# Patient Record
(demographics unavailable — no encounter records)

---

## 2024-10-08 NOTE — END OF VISIT
[] : Resident [FreeTextEntry3] : Pt. was seen 10/2/24 for annual physical exam, pt. felt dizzy and fell, landed on the floor and unable to get up.  Pt. found to have hypoxia required 3L O2, was transfer to ER for evaluation.  Pt. found to have hypercapnic hypoxia, refused O2 and uncooperative in ER.  Pt. was discharged, and here for follow up.  Pt. denies any injury as a result of fall.  During pt's last visit, pt. was c/o sore on lateral aspect of tongue x1 month, will refer pt. to oral surgery for evaluation. Also c/o leg swelling, will order 2D echo and UA.  Had Tdap 4/20/16, flu vaccine 10/2/24.  Blood work, advised staff to complete testing.  Had abdominal sono 8/29/24 showed hepatomegaly and increase liver echogenicity c/w steatosis or hepatocellular disease. Seen podiatry 3/27/24, but no showed for his ophthal. appointment 9/24/24, and no showed for cardiology 9/25/24; advised staff to reschedule ophthal., cardiology, and podiatry appointment.  Will refer pt. to pulmonary for evaluation of hypercapnic hypoxia.  Follow nutrition 11/14/24.  RTC 3 months.

## 2024-10-08 NOTE — REVIEW OF SYSTEMS
[Lower Ext Edema] : lower extremity edema [Cough] : cough [Negative] : Psychiatric [FreeTextEntry4] : + R tongue pain [FreeTextEntry5] : +airway congestion + LE edema [FreeTextEntry6] : +productive cough [FreeTextEntry9] : +leg swelling and pain

## 2024-10-08 NOTE — ASSESSMENT
[FreeTextEntry1] : 35-year-old M patient with PMH of intellectual delay from group home (high functioning), hypothyroidism, obesity and Type II DM, active smoker presented to the clinic for comprehensive office visit.   # Desaturation  # Hypercapnea  # OLSON - likely 2/2 to [facility] - Will refer to pulmonary for further workup  - Advised weight loss management, with diet and exercise   #Tongue sore - persisting sx for the past 1 month - s/p tooth removal - refered to oral surgeron  #Bilateral LE swelling and pain #Bilateral foot pain - Exam showing bilateral LE swelling w/o pitting edema. Given patient's body habitus, and suspected CYNTHIA, will obtain echo to rule out pulmonary hyptertension. - Will obtain labs to monitor TSH, Urine microalbumin, liver function tests. - Flows with podiatry, last visit March 2024 - Likely exacerbated by morbid obesity but will rule out other potential causes - Patient previously refusing pulm referral for potential CPAP at night. Will refer again   #Transaminits - Alt: 53--> 60 (5/18/2024) - RUQ 8/29/24: IMPRESSION: Hepatomegaly and increased liver echogenicity may be seen with steatosis or hepatocellular disease. - Will obtain labs at this visit and monitor liver enzymes  #Type II DM - HgA1C 6.3 (5/2024) - Ophthalmology appnt, No show on 9/24/24. Will instruct aide to reschedule appointment in house.  - Podiatry appnt, on 3/24/24 - c/w Metformin 500mg po once daily - dietary/lifestyle modification - weight loss, exercise as tolerated - low fat/chol. high fiber ADA weight reduction diet - repeat A1c today  #Hypothyroidism: - TSH 3.63 on 5/17/24 - C/w with levothyroxine 25mcg po once daily - repeat TSH today  #hypertriglyceride - triglycerides/LDL/total - 116/113/181 (5/2024) - continue Fenofibrate - repeat lipid panel today  # Morbid Obesity: - Diet and exercise - BMI: > 60 - pt is interested in bariatric surgery, but noncompliant - following with Nutrition Nov. 2024, will follow reccomendations  #Vit. D Insuff. - 25-OH Vit. D 36 on 05/2024 - continue vitamin D3 supplement - repeat Vitamin D levels  #Tobacco use disorder -smokes 6-8 cigarettes/ day -counselled on cessation  #HCM: - Flu vaccine to given 10-2-2024 - Vaccinated against Covid (2 shots, no booster) - Patient refused to consider PPSV23 (Diabetic) - Pt to reschedule Cardiology, Pulmonary, ophthalmology and podiatry appointment  - Nutrition appointment on 11/24  - RTC in 3 months with repeat labs

## 2024-10-08 NOTE — HISTORY OF PRESENT ILLNESS
[de-identified] : 35-year-old M patient with PMHx of intellectual delay from group home (high functioning), hypothyroidism, obesity and Type II DM, active smoker presents to the clinic for a comprehensive visit, accompanied by group home aide Zhane. Patient endorses bilateral LE swelling and pain to his calfs with bilateral plantar foot pain that has progressively worsened for the past 3 months. 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 Additionally, the patient endorses a tongue sore after his R upper tooth fell out. He does not remember how the sore happened but reports oral pain. According to the caretaker patient is othwerwsie stable, has good appetite, sleep pattern, stable mood no agitation, regular bowel movements.   Patient was seen last week. As the patient was waiting in the room after my clinical encounter, the patient experienced dizziness and fell from the examination chair forward onto the aide that was sitting in front of him. Vitals obtained were significant for low SpO2 at 89% on RA, otherwise BP stable at 117/82, HR 80, RR 13, afebrile. At this time SPo2 on RA was ~89%, otherwise stable. Patient was placed on 4L NC and O2 saturations increased to ~94%. Patient transfered  to the ED for further evaluation of this fall. In ED found to be hypercapnic and hypoxic, refused O2 via NC and asked to follow up outpatient with pulmonary.    [FreeTextEntry1] : Follow up

## 2024-10-08 NOTE — PHYSICAL EXAM
[No Acute Distress] : no acute distress [No Respiratory Distress] : no respiratory distress  [No Accessory Muscle Use] : no accessory muscle use [Normal Rate] : normal rate  [Regular Rhythm] : with a regular rhythm [Normal S1, S2] : normal S1 and S2 [No Murmur] : no murmur heard [No Abdominal Bruit] : a ~M bruit was not heard ~T in the abdomen [No Varicosities] : no varicosities [Pedal Pulses Present] : the pedal pulses are present [No Palpable Aorta] : no palpable aorta [No Extremity Clubbing/Cyanosis] : no extremity clubbing/cyanosis [Soft] : abdomen soft [Non Tender] : non-tender [Normal Bowel Sounds] : normal bowel sounds [No Rash] : no rash [de-identified] : +canker sore to the R lateral tongue [de-identified] : +bilateral rhonchii [de-identified] : + bilateral LE swelling without pitting edema from the foot to the posterior aspect of the knees. +No varicose veins seen, puncture wounds, signs of cellulitis.+ Bilateral LE are warm, well perfused [de-identified] : +obese abdomen

## 2024-10-24 NOTE — ASSESSMENT
[FreeTextEntry1] : #SOB #Morbid obesity #Heavy smoker #DM #DL #chronic LE swelling    Plan: will do echo highly advised for diet control and loos weight  follow up nutritionist highly advised to stop smoking ? COPD CYNTHIA follow up pulm  follow up podiatry DM/DL following PMD  Hospital chart reviewed  IF new or worsening symptoms patient advised to go to ED  Follow up in 2-3 m

## 2024-10-24 NOTE — HISTORY OF PRESENT ILLNESS
[FreeTextEntry1] : Mr Orozco is a 35-year-old male with past medical history of diabetes, hypothyroidism, intellectual delay, morbid obesity was recently evaluated in ED  after mechanical fall 10/2/2024 Patient fell while at doctor's appointment onto his aide.  No head trauma or LOC. also for sob and event of desaturation , basic workup in ED including cardiac enzyme were unremarkable, CXR ? cardiomegaly.  no congestion   currently stable, complains of chronic shortness of breath, morbidly obese.  chronic non pitting LE swelling  EKG sinus rhythm no ischemic changes.   + smoker,

## 2024-10-24 NOTE — REVIEW OF SYSTEMS
[Fever] : no fever [Chills] : no chills [SOB] : shortness of breath [Chest Discomfort] : no chest discomfort [Lower Ext Edema] : lower extremity edema [Palpitations] : no palpitations [Syncope] : no syncope [Wheezing] : wheezing [Convulsions] : no convulsions [Negative] : Gastrointestinal

## 2024-10-24 NOTE — PHYSICAL EXAM
[Obese] : obese [No Respiratory Distress] : no respiratory distress  [Wheeze ____] : wheeze [unfilled] [Soft] : abdomen soft [de-identified] : muffled heart sound  [de-identified] : obese  [de-identified] : chronic non pitting CARIDAD

## 2024-10-31 NOTE — REVIEW OF SYSTEMS
[Lower Ext Edema] : lower extremity edema [Shortness Of Breath] : shortness of breath [Cough] : cough [Dyspnea on Exertion] : dyspnea on exertion [Negative] : Genitourinary [Chest Pain] : no chest pain

## 2024-10-31 NOTE — ASSESSMENT
[FreeTextEntry1] : 35-year-old M patient with PMH of intellectual delay from group home (high functioning), hypothyroidism, obesity and Type II DM, active smoker presented to the clinic for follow-up office visit.  # Hypercapnia # OLSON #CYNTHIA?  - Patient had an incident last office visit where he suddenly became dizzy and desaturating  - Patient advised to go to ED-> was sent back home with pulm & cardio follow-up - likely 2/2 to [facility] - Patient saw cardiology on 10/24 -> recommended echo and pulm follow-up  - Advised weight loss management, with diet and exercise  #Tongue sore - Persisting sx for the past 1 month - s/p tooth removal - referred to oral surgeon  #Bilateral LE swelling and pain #Bilateral foot pain - Exam showing bilateral LE swelling w/o pitting edema. Given patient's body habitus, and suspected CYNTHIA, will obtain echo to rule out pulmonary hypertension. - Will obtain labs to monitor TSH, Urine microalbumin, liver function tests. - Flows with podiatry, last visit March 2024 - Likely exacerbated by morbid obesity but will rule out other potential causes - Patient previously refusing pulm referral for potential CPAP at night. Will refer again - venous duplex/doppler of b/l lower extremities  #Transaminits - AST 87,  - RUQ 8/29/24: IMPRESSION: Hepatomegaly and increased liver echogenicity may be seen with steatosis or hepatocellular disease. - Hepatology referral sent   #Pre-diabetes  - HgA1C 6.3 (11/2024) - Ophthalmology apt, No show on 9/24/24. Will instruct aide to reschedule appointment in house. - Podiatry apt, on 3/24/24 - c/w Metformin 500mg po once daily - dietary/lifestyle modification - weight loss, exercise as tolerated - low fat/chol. high fiber ADA weight reduction diet  #Hypothyroidism: - TSH 2.52 on 10/24 - C/w with levothyroxine 25mcg po once daily  #hypertriglyceride - triglycerides/LDL/l - 99/111 (11/2024) - C/W Fenofibrate  # Morbid Obesity: - Diet and exercise - BMI: > 60 - pt is interested in bariatric surgery, but noncompliant - Needs to follow-up with nutrition   #Vit. D Insuff. - 25-OH Vit. D 36 on 10/2024 - continue vitamin D3 supplement  #Tobacco use disorder -smokes 6-8 cigarettes/ day -counselled on cessation  #HCM: - Flu vaccine to given 10-2-2024 - Vaccinated against Covid (2 shots, no booster) - Patient refused to consider PPSV23 (Diabetic) - Pt to schedule Pulmonary, ophthalmology and podiatry, and hepatology appointment - Nutrition appointment on 11/24 - RTC in 3 months

## 2024-10-31 NOTE — END OF VISIT
[] : Resident [FreeTextEntry3] : Pt. fell and refused to go to hospital on 10/24/24, here for follow up.  Had Tdap 4/20/16, flu vaccine 10/2/24.  Blood work 10/11/24 25-OH Vit. D 36, SGOT/SGPT 87/138, TSH 2.52, A1C 6.3, , triglyceride 99.  Had abdominal sono 8/29/24 showed hepatomegaly and increase liver echogenicity c/w steatosis or hepatocellular disease.  Seen cardiology 10/24/24, recommended 2D echo, pulmonary, and weight loss.  Seen podiatry 3/27/24.  Hepatology referral for transaminitis.  Will refer pt. to endocrine for obesity, and ?? treatment for obesity. Staff reports pt. has oral surgery appointment scheduled for tomorrow for oral ulcer, and advised staff to schedule podiatry appointment.  Also advised staff to complete 2D echo.  Follow cardiology 11/14/24, nutrition 11/14/24, pulmonary 12/10/24 for hypoxia hypercapnea/CYNTHIA, ophthal. 3/6/25.  RTC 3 months.  Since pt. has b/l leg swelling, will order venous duplex/doppler of b/l lower extremities.  Pt. is at risk to fall, would not cleared or recommended pt to return to work until workup for CYNTHIA is completed, and cleared by pulmonary.

## 2024-10-31 NOTE — PHYSICAL EXAM
[No Respiratory Distress] : no respiratory distress  [No Accessory Muscle Use] : no accessory muscle use [Normal] : normal rate, regular rhythm, normal S1 and S2 and no murmur heard [Soft] : abdomen soft [Non Tender] : non-tender [de-identified] : Patient is obese and appears lethargic.  [de-identified] : Coarse breath sounds.  [de-identified] : Obese.

## 2024-10-31 NOTE — HISTORY OF PRESENT ILLNESS
[de-identified] : 35-year-old M patient with PMHx of intellectual delay from group home (high functioning), hypothyroidism, obesity and Type II DM, active smoker presents to the clinic for a follow-up visit, accompanied by group home aide Zhane. Patient was last seen on 10/08 in clinic. During this encounter the patient experienced dizziness and fell from the examination chair forward onto the aide that was sitting in front of him. Vitals obtained were significant for low SpO2 at 89% on RA, otherwise BP stable at 117/82, HR 80, RR 13, afebrile. At this time SPo2 on RA was ~89%, otherwise stable. Patient was placed on 4L NC and O2 saturations increased to ~94%. Patient transferred to the ED for further evaluation of this fall. In ED found to be hypercapnic and hypoxic, refused O2 via NC and asked to follow up outpatient with pulmonary.  Today patient reports feeling "horrible" like he is "going to die"- when asked to elaborate patient is unable to. He reports SOB, fatigue, and painful leg swelling. Patient had another incident of unwitnessed fall at work 1 week ago, EMS was called but he refused to go to ED. It is unclear if patient loss consciousness during episode of fall. Patient's mom was spoken to on the phone, and she inquired if patient is clear to return to work, she was informed that given patient's recent history of recurrent falls we do not believe it is safe for patient to return to work at this time.